# Patient Record
Sex: FEMALE | Race: WHITE | Employment: FULL TIME | URBAN - METROPOLITAN AREA
[De-identification: names, ages, dates, MRNs, and addresses within clinical notes are randomized per-mention and may not be internally consistent; named-entity substitution may affect disease eponyms.]

---

## 2024-04-05 ENCOUNTER — OFFICE VISIT (OUTPATIENT)
Dept: URGENT CARE | Facility: CLINIC | Age: 59
End: 2024-04-05
Payer: COMMERCIAL

## 2024-04-05 VITALS
OXYGEN SATURATION: 94 % | WEIGHT: 159.6 LBS | DIASTOLIC BLOOD PRESSURE: 107 MMHG | SYSTOLIC BLOOD PRESSURE: 160 MMHG | HEART RATE: 103 BPM | TEMPERATURE: 98.6 F | HEIGHT: 64 IN | BODY MASS INDEX: 27.25 KG/M2

## 2024-04-05 DIAGNOSIS — J06.9 VIRAL UPPER RESPIRATORY TRACT INFECTION: Primary | ICD-10-CM

## 2024-04-05 PROBLEM — H11.33: Status: RESOLVED | Noted: 2024-04-05 | Resolved: 2024-04-05

## 2024-04-05 PROBLEM — J30.2 SEASONAL ALLERGIC RHINITIS: Status: ACTIVE | Noted: 2017-05-30

## 2024-04-05 PROBLEM — I10 HYPERTENSION: Status: ACTIVE | Noted: 2024-04-05

## 2024-04-05 LAB
POC RAPID INFLUENZA A: NEGATIVE
POC RAPID INFLUENZA B: NEGATIVE

## 2024-04-05 PROCEDURE — 87636 SARSCOV2 & INF A&B AMP PRB: CPT

## 2024-04-05 PROCEDURE — 99213 OFFICE O/P EST LOW 20 MIN: CPT

## 2024-04-05 PROCEDURE — 3080F DIAST BP >= 90 MM HG: CPT

## 2024-04-05 PROCEDURE — 3077F SYST BP >= 140 MM HG: CPT

## 2024-04-05 PROCEDURE — 87804 INFLUENZA ASSAY W/OPTIC: CPT | Mod: CLIA WAIVED TEST

## 2024-04-05 RX ORDER — TOBRAMYCIN AND DEXAMETHASONE 3; 1 MG/ML; MG/ML
SUSPENSION/ DROPS OPHTHALMIC
COMMUNITY
Start: 2019-07-09

## 2024-04-05 RX ORDER — FLUTICASONE PROPIONATE 50 MCG
1 SPRAY, SUSPENSION (ML) NASAL DAILY
Qty: 16 G | Refills: 0 | Status: SHIPPED | OUTPATIENT
Start: 2024-04-05 | End: 2025-04-05

## 2024-04-05 RX ORDER — GUAIFENESIN 600 MG/1
600 TABLET, EXTENDED RELEASE ORAL 2 TIMES DAILY
Qty: 20 TABLET | Refills: 0 | Status: SHIPPED | OUTPATIENT
Start: 2024-04-05 | End: 2024-04-15

## 2024-04-05 RX ORDER — METOPROLOL SUCCINATE 50 MG/1
50 TABLET, EXTENDED RELEASE ORAL
COMMUNITY
Start: 2022-09-14

## 2024-04-05 RX ORDER — BROMPHENIRAMINE MALEATE, PSEUDOEPHEDRINE HYDROCHLORIDE, AND DEXTROMETHORPHAN HYDROBROMIDE 2; 30; 10 MG/5ML; MG/5ML; MG/5ML
10 SYRUP ORAL 4 TIMES DAILY PRN
Qty: 120 ML | Refills: 0 | Status: SHIPPED | OUTPATIENT
Start: 2024-04-05 | End: 2024-04-15

## 2024-04-05 RX ORDER — BENZONATATE 200 MG/1
200 CAPSULE ORAL 3 TIMES DAILY PRN
Qty: 21 CAPSULE | Refills: 0 | Status: SHIPPED | OUTPATIENT
Start: 2024-04-05 | End: 2024-04-12

## 2024-04-05 RX ORDER — LISINOPRIL AND HYDROCHLOROTHIAZIDE 10; 12.5 MG/1; MG/1
1 TABLET ORAL DAILY
COMMUNITY
End: 2024-04-05 | Stop reason: SINTOL

## 2024-04-05 RX ORDER — LOSARTAN POTASSIUM AND HYDROCHLOROTHIAZIDE 25; 100 MG/1; MG/1
1 TABLET ORAL DAILY
COMMUNITY
Start: 2024-02-27

## 2024-04-05 ASSESSMENT — ENCOUNTER SYMPTOMS
PALPITATIONS: 0
SHORTNESS OF BREATH: 1
ACTIVITY CHANGE: 1
CARDIOVASCULAR NEGATIVE: 1
BACK PAIN: 1
VOMITING: 0
SINUS PRESSURE: 1
RHINORRHEA: 1
ADENOPATHY: 1
DIARRHEA: 0
DIZZINESS: 0
ARTHRALGIAS: 0
CHILLS: 1
NAUSEA: 0
ABDOMINAL PAIN: 0
WEAKNESS: 1
LIGHT-HEADEDNESS: 1
HEADACHES: 1
FATIGUE: 1
FEVER: 1
GASTROINTESTINAL NEGATIVE: 1
COUGH: 1
MYALGIAS: 0
SLEEP DISTURBANCE: 1
TROUBLE SWALLOWING: 0
SORE THROAT: 1

## 2024-04-05 NOTE — PROGRESS NOTES
"No Muñiz is a 58 y.o. female who presents for Sinusitis.    Patient presents with sinus congestion/drainage, facial and eye pressure, fatigue/excessive sleeping, left sided gland tenderness and dry cough for the last 2 days. She reports a history of allergies usually more in the fall. Patient reports trying OTC medications including Mucinex d and  Acetaminophen/Ibuprofen with moderate relief.       History provided by:  Patient   used: No    Sinusitis  Associated symptoms: chills, congestion, cough, fatigue, fever, headaches, rhinorrhea, shortness of breath and sore throat    Associated symptoms: no chest pain, no ear pain, no nausea and no vomiting        BP (!) 160/107 (BP Location: Left arm, Patient Position: Sitting, BP Cuff Size: Small adult)   Pulse 103   Temp 37 °C (98.6 °F) (Oral)   Ht 1.626 m (5' 4\")   Wt 72.4 kg (159 lb 9.6 oz)   SpO2 94%   BMI 27.40 kg/m²    All vitals have been reviewed and are stable.    Review of Systems   Constitutional:  Positive for activity change, chills, fatigue and fever.   HENT:  Positive for congestion, postnasal drip, rhinorrhea, sinus pressure and sore throat. Negative for ear pain and trouble swallowing.    Respiratory:  Positive for cough and shortness of breath.    Cardiovascular: Negative.  Negative for chest pain and palpitations.   Gastrointestinal: Negative.  Negative for abdominal pain, diarrhea, nausea and vomiting.   Musculoskeletal:  Positive for back pain. Negative for arthralgias and myalgias.   Skin: Negative.  Negative for rash.   Allergic/Immunologic: Positive for environmental allergies.   Neurological:  Positive for weakness, light-headedness and headaches. Negative for dizziness.   Hematological:  Positive for adenopathy.   Psychiatric/Behavioral:  Positive for sleep disturbance.        Objective   Physical Exam  Vitals and nursing note reviewed.   Constitutional:       General: She is not in acute " distress.     Appearance: Normal appearance. She is ill-appearing.   HENT:      Head: Normocephalic and atraumatic.      Right Ear: External ear normal.      Left Ear: External ear normal.      Nose: Mucosal edema, congestion and rhinorrhea present.      Mouth/Throat:      Lips: Pink.      Mouth: Mucous membranes are moist.      Palate: No lesions.      Pharynx: Uvula midline. Oropharyngeal exudate and posterior oropharyngeal erythema present.      Tonsils: No tonsillar exudate.   Eyes:      Extraocular Movements: Extraocular movements intact.      Conjunctiva/sclera: Conjunctivae normal.      Right eye: Right conjunctiva is not injected.      Left eye: Left conjunctiva is not injected.      Pupils: Pupils are equal, round, and reactive to light.   Cardiovascular:      Rate and Rhythm: Normal rate and regular rhythm.      Heart sounds: Normal heart sounds.   Pulmonary:      Effort: Pulmonary effort is normal. No respiratory distress.      Breath sounds: Normal breath sounds and air entry. No stridor. No wheezing, rhonchi or rales.   Abdominal:      General: Abdomen is flat.      Palpations: Abdomen is soft.   Musculoskeletal:         General: Normal range of motion.      Cervical back: Normal range of motion and neck supple.   Lymphadenopathy:      Cervical: No cervical adenopathy.   Skin:     General: Skin is warm and dry.   Neurological:      General: No focal deficit present.      Mental Status: She is alert and oriented to person, place, and time. Mental status is at baseline.   Psychiatric:         Mood and Affect: Mood normal.         Behavior: Behavior normal.         Assessment/Plan   Problem List Items Addressed This Visit    None  Visit Diagnoses       Viral upper respiratory tract infection    -  Primary    Relevant Medications    fluticasone (Flonase) 50 mcg/actuation nasal spray    Other Relevant Orders    POCT Influenza A/B manually resulted (Completed)    Sars-CoV-2 and Influenza A/B PCR (Completed)             Red flags for reporting to ER have been reviewed with the patient.    Current diagnosis, any medication changes, and all in-office lab or radiologic results have been reviewed with the patient at the time of the visit.   If symptoms do not improve or worsen, patient is to follow up with PCP or report to the emergency room.   Patient is alert and oriented x3 and non-toxic appearing. Vital signs are stable.   Patient and/or guardian has sufficient decision-making capabilities at this time and reports understanding and agreement with the treatment plan made through shared decision-making.

## 2024-04-05 NOTE — PATIENT INSTRUCTIONS
UPPER RESPIRATORY INFECTION       - In office rapid flu testing was performed with negative results   - COVID-19 and flu PCR testing performed in office today, your results can take 24-72 hours to come back     - BENZONATATE (Tessalon perles) have been prescribed to help reduce bronchial irritation and cough   - Mucinex is recommended to help thin mucus making it easier to clear and reduce nasal congestion    - FLUTICASONE (Flonase) is an OTC nasal steroid that helps reduce swelling in your nasal passages, may use saline rinse (Neti pot) to clear sinus cavities before use        - Recommend over-the-counter (OTC) nasal decongestant (Phenylephrine) or Mucinex DM (cough suppressant) and Medrol steroid (if prescribed) during the DAY (awake hours) and Bromfed syrup at NIGHT (before sleep) if cough syrup causes excessive drowsiness     - Use Ibuprofen (Advil) or Acetaminophen (Tylenol) at home for headache and fever reduction if needed   - Recommend use of OTC cough and cold syrups like Dayquil/Nyquil or Mucinex containing guaifenesin (thins mucus), phenylephrine (nasal decongestant) , and/or dextromethorphan (cough suppressant) if not using Bromfed RX       - Increase rest and fluids to recover sooner and loosen mucus     - Warm and cold liquids such as tea, broth-based soup, or popsicles can help temporarily relieve pain in throat     - May use a humidifier, cough drops, saline nasal wash (Neti pot) for symptom relief     - Avoid cigarette smoke and do not vape as this will continue to irritate airway (may use nicotine gum or patches if nicotine dependent)    - While you are awaiting COVID-19 test results, you are to self-quarantine in your home. Use a separate room and restroom if possible. Wear a mask, gloves when possible and stay six feet from others in your home. You may search the CDC website for official guidelines on proper self-quarantining procedures. Only leave the home if there is worsening shortness of  breath, chest pain, or continued fever that cannot be controlled (or any other new/concerning symptoms), in which case you should call or report to your local ER.  If you cannot safely get to the ER by private vehicle, please call 911 if needed.

## 2024-04-05 NOTE — LETTER
April 5, 2024     Patient: Junie Muñiz   YOB: 1965   Date of Visit: 4/5/2024       To Whom It May Concern:    Junie Muñiz was seen in my clinic on 4/5/2024 at 1:20 pm. Please excuse Junie for her absence from work on 4/5/24-4/6/24 due to illness. She may return 4/8/24 if COVID/flu negative and symptoms improved.     If you have any questions or concerns, please don't hesitate to call.         Sincerely,         Keli Hammond PA-C        CC: No Recipients

## 2024-04-06 LAB
FLUAV RNA RESP QL NAA+PROBE: NOT DETECTED
FLUBV RNA RESP QL NAA+PROBE: DETECTED
SARS-COV-2 RNA RESP QL NAA+PROBE: NOT DETECTED